# Patient Record
Sex: FEMALE | Race: WHITE | NOT HISPANIC OR LATINO | Employment: UNEMPLOYED | URBAN - METROPOLITAN AREA
[De-identification: names, ages, dates, MRNs, and addresses within clinical notes are randomized per-mention and may not be internally consistent; named-entity substitution may affect disease eponyms.]

---

## 2023-03-30 ENCOUNTER — TRANSFERRED RECORDS (OUTPATIENT)
Dept: HEALTH INFORMATION MANAGEMENT | Facility: CLINIC | Age: 38
End: 2023-03-30

## 2023-03-31 ENCOUNTER — MEDICAL CORRESPONDENCE (OUTPATIENT)
Dept: HEALTH INFORMATION MANAGEMENT | Facility: CLINIC | Age: 38
End: 2023-03-31

## 2023-03-31 ENCOUNTER — TRANSCRIBE ORDERS (OUTPATIENT)
Dept: MATERNAL FETAL MEDICINE | Facility: CLINIC | Age: 38
End: 2023-03-31

## 2023-03-31 DIAGNOSIS — O26.90 PREGNANCY RELATED CONDITION, ANTEPARTUM: Primary | ICD-10-CM

## 2023-04-07 ENCOUNTER — APPOINTMENT (OUTPATIENT)
Dept: ULTRASOUND IMAGING | Facility: CLINIC | Age: 38
End: 2023-04-07
Attending: EMERGENCY MEDICINE
Payer: COMMERCIAL

## 2023-04-07 ENCOUNTER — TRANSFERRED RECORDS (OUTPATIENT)
Dept: HEALTH INFORMATION MANAGEMENT | Facility: CLINIC | Age: 38
End: 2023-04-07

## 2023-04-07 ENCOUNTER — HOSPITAL ENCOUNTER (EMERGENCY)
Facility: CLINIC | Age: 38
Discharge: HOME | End: 2023-04-07
Attending: EMERGENCY MEDICINE | Admitting: EMERGENCY MEDICINE
Payer: COMMERCIAL

## 2023-04-07 VITALS
OXYGEN SATURATION: 100 % | HEART RATE: 79 BPM | DIASTOLIC BLOOD PRESSURE: 79 MMHG | RESPIRATION RATE: 18 BRPM | SYSTOLIC BLOOD PRESSURE: 119 MMHG | TEMPERATURE: 98.1 F

## 2023-04-07 DIAGNOSIS — O46.90 VAGINAL BLEEDING IN PREGNANCY: ICD-10-CM

## 2023-04-07 DIAGNOSIS — R10.2 PELVIC PAIN IN PREGNANCY: ICD-10-CM

## 2023-04-07 DIAGNOSIS — O26.899 PELVIC PAIN IN PREGNANCY: ICD-10-CM

## 2023-04-07 DIAGNOSIS — D25.9 UTERINE LEIOMYOMA, UNSPECIFIED LOCATION: ICD-10-CM

## 2023-04-07 LAB
ABO/RH(D): NORMAL
ANION GAP SERPL CALCULATED.3IONS-SCNC: 12 MMOL/L (ref 7–15)
BASOPHILS # BLD AUTO: 0 10E3/UL (ref 0–0.2)
BASOPHILS NFR BLD AUTO: 0 %
BUN SERPL-MCNC: 8.3 MG/DL (ref 6–20)
CALCIUM SERPL-MCNC: 9.8 MG/DL (ref 8.6–10)
CHLORIDE SERPL-SCNC: 101 MMOL/L (ref 98–107)
CREAT SERPL-MCNC: 0.45 MG/DL (ref 0.51–0.95)
DEPRECATED HCO3 PLAS-SCNC: 24 MMOL/L (ref 22–29)
EOSINOPHIL # BLD AUTO: 0.1 10E3/UL (ref 0–0.7)
EOSINOPHIL NFR BLD AUTO: 1 %
ERYTHROCYTE [DISTWIDTH] IN BLOOD BY AUTOMATED COUNT: 11.5 % (ref 10–15)
GFR SERPL CREATININE-BSD FRML MDRD: >90 ML/MIN/1.73M2
GLUCOSE SERPL-MCNC: 88 MG/DL (ref 70–99)
HCT VFR BLD AUTO: 37.1 % (ref 35–47)
HGB BLD-MCNC: 12.9 G/DL (ref 11.7–15.7)
HOLD SPECIMEN: NORMAL
IMM GRANULOCYTES # BLD: 0 10E3/UL
IMM GRANULOCYTES NFR BLD: 0 %
LYMPHOCYTES # BLD AUTO: 2.3 10E3/UL (ref 0.8–5.3)
LYMPHOCYTES NFR BLD AUTO: 27 %
MCH RBC QN AUTO: 33.7 PG (ref 26.5–33)
MCHC RBC AUTO-ENTMCNC: 34.8 G/DL (ref 31.5–36.5)
MCV RBC AUTO: 97 FL (ref 78–100)
MONOCYTES # BLD AUTO: 0.5 10E3/UL (ref 0–1.3)
MONOCYTES NFR BLD AUTO: 5 %
NEUTROPHILS # BLD AUTO: 5.6 10E3/UL (ref 1.6–8.3)
NEUTROPHILS NFR BLD AUTO: 67 %
NRBC # BLD AUTO: 0 10E3/UL
NRBC BLD AUTO-RTO: 0 /100
PLATELET # BLD AUTO: 216 10E3/UL (ref 150–450)
POTASSIUM SERPL-SCNC: 3.9 MMOL/L (ref 3.4–5.3)
RBC # BLD AUTO: 3.83 10E6/UL (ref 3.8–5.2)
SODIUM SERPL-SCNC: 137 MMOL/L (ref 136–145)
SPECIMEN EXPIRATION DATE: NORMAL
WBC # BLD AUTO: 8.5 10E3/UL (ref 4–11)

## 2023-04-07 PROCEDURE — 80048 BASIC METABOLIC PNL TOTAL CA: CPT | Performed by: EMERGENCY MEDICINE

## 2023-04-07 PROCEDURE — 36415 COLL VENOUS BLD VENIPUNCTURE: CPT | Performed by: EMERGENCY MEDICINE

## 2023-04-07 PROCEDURE — 99285 EMERGENCY DEPT VISIT HI MDM: CPT | Mod: 25

## 2023-04-07 PROCEDURE — 85025 COMPLETE CBC W/AUTO DIFF WBC: CPT | Performed by: EMERGENCY MEDICINE

## 2023-04-07 PROCEDURE — 86901 BLOOD TYPING SEROLOGIC RH(D): CPT | Performed by: EMERGENCY MEDICINE

## 2023-04-07 PROCEDURE — 76805 OB US >/= 14 WKS SNGL FETUS: CPT

## 2023-04-07 NOTE — ED TRIAGE NOTES
Patient has been having vaginal bleeding and abdominal pain for 3 weeks. Patient is 15 weeks pregnant and was advised to come tot he ED by her OB     Triage Assessment     Row Name 04/07/23 1553       Triage Assessment (Adult)    Airway WDL WDL       Respiratory WDL    Respiratory WDL WDL       Skin Circulation/Temperature WDL    Skin Circulation/Temperature WDL WDL       Cardiac WDL    Cardiac WDL WDL       Peripheral/Neurovascular WDL    Peripheral Neurovascular WDL WDL       Cognitive/Neuro/Behavioral WDL    Cognitive/Neuro/Behavioral WDL WDL

## 2023-04-08 NOTE — DISCHARGE INSTRUCTIONS
Discharge Instructions  Vaginal Bleeding in Pregnancy    Bleeding in early pregnancy can be a sign of a miscarriage or an abnormal pregnancy, but often is innocent and the pregnancy will continue normally. We may do blood pregnancy tests and ultrasound to try to determine what is causing the bleeding, but sometimes we are unable to tell. If this is the case, it will often require more time, more blood tests, and another ultrasound.     Generally, every Emergency Department visit should have a follow-up clinic visit with either a primary or a specialty clinic/provider. Please follow-up as instructed by your emergency provider today.    Return to the Emergency Department if:  You have severe abdominal (belly) or pelvic pain.  You faint, or feel lightheaded or dizzy.   Your bleeding gets much worse.  You pass any tissue--solid material that does not look like a blood clot. If you pass tissue, save it (even if you have to pull it out of the toilet) and put it in a plastic bag or jar and bring it in.    You have a fever of 100.5 F or higher.  If no pregnancy could be seen:  It may be that everything is normal and it is just too early to see the pregnancy. It is also possible that you could have an ectopic pregnancy, which is a pregnancy in an abnormal location, such as in the tube ( tubal pregnancy ). We don t see evidence that this is likely today but we cannot exclude it with certainty until a pregnancy can be seen in the uterus (womb) and an ectopic pregnancy can cause severe internal bleeding or death so follow-up is very important.  You should not be alone, in case you suddenly become very sick.   You should not have sex or put anything in your vagina.     If a pregnancy was seen in your uterus:  If a heartbeat could be seen, the chance of miscarriage is lower but because you had bleeding the chance of a miscarriage still exists.  You should not have sex or put anything in your vagina.  Follow-up as directed with  your OB/GYN provider.     Facts about miscarriage: We hope you do not have a miscarriage, but if you do, here are important things to know:  Early miscarriage is very common, and having one miscarriage does not mean you will have problems with another pregnancy.  Nothing you did caused it. Taking medicine, drinking alcohol, having sex, exercising, or falling down will not cause a miscarriage.     If you were given a prescription for medicine here today, be sure to read all of the information (including the package insert) that comes with your prescription.  This will include important information about the medicine, its side effects, and any warnings that you need to know about.  The pharmacist who fills the prescription can provide more information and answer questions you may have about the medicine.  If you have questions or concerns that the pharmacist cannot address, please call or return to the Emergency Department.   Remember that you can always come back to the Emergency Department if you are not able to see your regular provider in the amount of time listed above, if you get any new symptoms, or if there is anything that worries you.     Discharge Instructions  Abdominal Pain    Abdominal pain (belly pain) can be caused by many things. Your evaluation today does not show the exact cause for your pain. Your provider today has decided that it is unlikely your pain is due to a life threatening problem, or a problem requiring surgery or hospital admission. Sometimes those problems cannot be found right away, so it is very important that you follow up as directed.  Sometimes only the changes which occur over time allow the cause of your pain to be found.    Generally, every Emergency Department visit should have a follow-up clinic visit with either a primary or a specialty clinic/provider. Please follow-up as instructed by your emergency provider today. With abdominal pain, we often recommend very close  follow-up, such as the following day.    ADULTS:  Return to the Emergency Department right away if:    You get an oral temperature above 102oF or as directed by your provider.  You have blood in your stools. This may be bright red or appear as black, tarry stools.    You keep vomiting (throwing up) or cannot drink liquids.  You see blood when you vomit.   You cannot have a bowel movement or you cannot pass gas.  Your stomach gets bloated or bigger.  Your skin or the whites of your eyes look yellow.  You faint.  You have bloody, frequent or painful urination (peeing).  You have new symptoms or anything that worries you.    CHILDREN:  Return to the Emergency Department right away if your child has any of the above-listed symptoms or the following:    Pushes your hand away or screams/cries when his/her belly is touched.  You notice your child is very fussy or weak.  Your child is very tired and is too tired to eat or drink.  Your child is dehydrated.  Signs of dehydration can be:  Significant change in the amount of wet diapers/urine.  Your infant or child starts to have dry mouth and lips, or no saliva (spit) or tears.    PREGNANT WOMEN:  Return to the Emergency Department right away if you have any of the above-listed symptoms or the following:    You have bleeding, leaking fluid or passing tissue from the vagina.  You have worse pain or cramping, or pain in your shoulder or back.  You have vomiting that will not stop.  You have a temperature of 100oF or more.  Your baby is not moving as much as usual.  You faint.  You get a bad headache with or without eye problems and abdominal pain.  You have a seizure.  You have unusual discharge from your vagina and abdominal pain.    Abdominal pain is pretty common during pregnancy.  Your pain may or may not be related to your pregnancy. You should follow-up closely with your OB provider so they can evaluate you and your baby.  Until you follow-up with your regular provider, do  "the following:     Avoid sex and do not put anything in your vagina.  Drink clear fluids.  Only take medications approved by your provider.    MORE INFORMATION:    Appendicitis:  A possible cause of abdominal pain in any person who still has their appendix is acute appendicitis. Appendicitis is often hard to diagnose.  Testing does not always rule out early appendicitis or other causes of abdominal pain. Close follow-up with your provider and re-evaluations may be needed to figure out the reason for your abdominal pain.    Follow-up:  It is very important that you make an appointment with your clinic and go to the appointment.  If you do not follow-up with your primary provider, it may result in missing an important development which could result in permanent injury or disability and/or lasting pain.  If there is any problem keeping your appointment, call your provider or return to the Emergency Department.    Medications:  Take your medications as directed by your provider today.  Before using over-the-counter medications, ask your provider and make sure to take the medications as directed.  If you have any questions about medications, ask your provider.    Diet:  Resume your normal diet as much as possible, but do not eat fried, fatty or spicy foods while you have pain.  Do not drink alcohol or have caffeine.  Do not smoke tobacco.    Probiotics: If you have been given an antibiotic, you may want to also take a probiotic pill or eat yogurt with live cultures. Probiotics have \"good bacteria\" to help your intestines stay healthy. Studies have shown that probiotics help prevent diarrhea (loose stools) and other intestine problems (including C. diff infection) when you take antibiotics. You can buy these without a prescription in the pharmacy section of the store.     If you were given a prescription for medicine here today, be sure to read all of the information (including the package insert) that comes with your " prescription.  This will include important information about the medicine, its side effects, and any warnings that you need to know about.  The pharmacist who fills the prescription can provide more information and answer questions you may have about the medicine.  If you have questions or concerns that the pharmacist cannot address, please call or return to the Emergency Department.       Remember that you can always come back to the Emergency Department if you are not able to see your regular provider in the amount of time listed above, if you get any new symptoms, or if there is anything that worries you.

## 2023-04-08 NOTE — ED PROVIDER NOTES
History     Chief Complaint:  Abdominal Pain       HPI Indonesian  via iPhone was used for communication  Mayte Jackson is a 37 year old female  approximately 15 weeks pregnant and otherwise healthy at baseline who presents with concerns for ongoing vaginal bleeding with new pelvic pain.  She notes she has been having 3 weeks of passing clots intermittently for which she is seeing her OB.  She states she has a placental abnormality.  She notes because of the pain she called her OB and they told her to come to the emergency department.  She notes the bleeding has been about the same.  She denies fever or chills.  She denies dizziness or syncope.  She denies any other concerns on today's visit.      Independent Historian:   None - Patient Only    Review of External Notes: I reviewed outpatient records, no significant information of assistance in today's evaluation    ROS:  Review of Systems   Genitourinary: Positive for pelvic pain and vaginal bleeding.   All other systems reviewed and are negative.      Allergies:  No Known Allergies     Medications:    No chronic daily medications    Past Medical History:    No chronic medical problems    Social History:   Here with her significant other  PCP: No primary care provider on file.     Physical Exam   Patient Vitals for the past 24 hrs:   BP Temp Pulse Resp SpO2   23 1505 119/79 98.1  F (36.7  C) 79 18 100 %        Physical Exam  GEN: Adult female, sitting upright  EYES: PERRL, conjunctiva normal  ENT: Moist mucous membranes. Oropharynx clear. No exudate or lesions.  CV: Normal S1S2. Regular rate and rhythm. No murmurs, rubs, or gallops.  RESP: Clear to auscultation bilaterally. Normal effort. No wheezes, rales, or rhonchi.  GI: Abdomen is soft. Mild tenderness in the lower abdomen, most prominent on the left.  No rebound or guarding.  GYN: Normal appearing external genitalia.  Small amount of brownish discharge in the vaginal vault. Closed  cervical os. Normal appearing cervix.   MSK: Ambulatory. Moves all extremities normally. No edema.  Skin: Warm and dry. No rashes, lesions or ecchymoses. No petechiae.  Neuro: Alert and oriented.  Responds appropriately to all questions and commands. No focal abnormalities appreciated.  Psych: Normal affect. Pleasant.    Emergency Department Course       Imaging:  OB  US 2-3 trimester w transvag   Final Result   IMPRESSION:     1.  Single living intrauterine gestation.   2.  Based on this exam, composite age of 15 weeks 3 days with EDC 09/26/2023.   3.  Small uterine fibroid.   4.  Cervix is slightly shortened measuring 2.6 cm with mild funneling.         Report per radiology    Laboratory:  Labs Ordered and Resulted from Time of ED Arrival to Time of ED Departure   BASIC METABOLIC PANEL - Abnormal       Result Value    Sodium 137      Potassium 3.9      Chloride 101      Carbon Dioxide (CO2) 24      Anion Gap 12      Urea Nitrogen 8.3      Creatinine 0.45 (*)     Calcium 9.8      Glucose 88      GFR Estimate >90     CBC WITH PLATELETS AND DIFFERENTIAL - Abnormal    WBC Count 8.5      RBC Count 3.83      Hemoglobin 12.9      Hematocrit 37.1      MCV 97      MCH 33.7 (*)     MCHC 34.8      RDW 11.5      Platelet Count 216      % Neutrophils 67      % Lymphocytes 27      % Monocytes 5      % Eosinophils 1      % Basophils 0      % Immature Granulocytes 0      NRBCs per 100 WBC 0      Absolute Neutrophils 5.6      Absolute Lymphocytes 2.3      Absolute Monocytes 0.5      Absolute Eosinophils 0.1      Absolute Basophils 0.0      Absolute Immature Granulocytes 0.0      Absolute NRBCs 0.0     ABO AND RH    ABO/RH(D) A POS      SPECIMEN EXPIRATION DATE [id number]         Emergency Department Course & Assessments:    Assessments:  I examined the patient.  I performed a pelvic examination.   I reassessed the patient and discussed findings of today's evaluation.  All questions were answered.      Social Determinants of  Health affecting care:   None    Disposition:  The patient was discharged to home.     Impression & Plan      Medical Decision Making:  Mayte Jackson is a 37 year old female healthy at baseline who presents for evaluation of vaginal bleeding and pelvic pain in the setting of second trimester pregnancy.  There is no clear source of the bleeding noted on evaluation here, fibroid considered and possible, but no evidence of placental issue at this time.  Is no significant blood in the vaginal vault on pelvic examination.  With regards to her pelvic pain, I considered a broad differential including ovarian cyst, UTI, pyelonephritis, subchorionic hemorrhage, uterine bleeding, active miscarriage, constipation, diverticulitis, etc.  More rare but serious etiologies considered included ectopic pregnancy, appendicitis, cholecystitis, volvulus, intraabdominal abscess, heterotopic pregnancy, etc.  In this patient, there are no signs of serious etiologies of abdominal pain.  CT scan did not seem indicated clinically with her overall well appearance.  Supportive outpatient management is therefore indicated with her OB in the upcoming days.  Plan is home, close follow-up with OB, threatened miscarriage precautions, and return to ED for worsening pain, heavy vaginal bleeding (more than 1 pad soaked every hour).  Questions were answered.  She felt comfortable this plan,    Diagnosis:    ICD-10-CM    1. Vaginal bleeding in pregnancy  O46.90       2. Pelvic pain in pregnancy  O26.899     R10.2       3. Uterine leiomyoma, unspecified location  D25.9             4/7/2023   Emily Rivas MD Jonkman, Tracy Dianne, MD  04/08/23 1816

## 2023-04-20 ENCOUNTER — PRE VISIT (OUTPATIENT)
Dept: MATERNAL FETAL MEDICINE | Facility: CLINIC | Age: 38
End: 2023-04-20
Payer: COMMERCIAL

## 2023-04-27 ENCOUNTER — HOSPITAL ENCOUNTER (OUTPATIENT)
Dept: ULTRASOUND IMAGING | Facility: CLINIC | Age: 38
Discharge: HOME | End: 2023-04-27
Attending: ADVANCED PRACTICE MIDWIFE
Payer: COMMERCIAL

## 2023-04-27 ENCOUNTER — OFFICE VISIT (OUTPATIENT)
Dept: MATERNAL FETAL MEDICINE | Facility: CLINIC | Age: 38
End: 2023-04-27
Attending: ADVANCED PRACTICE MIDWIFE
Payer: COMMERCIAL

## 2023-04-27 DIAGNOSIS — O26.90 PREGNANCY RELATED CONDITION, ANTEPARTUM: ICD-10-CM

## 2023-04-27 DIAGNOSIS — O09.522 MULTIGRAVIDA OF ADVANCED MATERNAL AGE IN SECOND TRIMESTER: Primary | ICD-10-CM

## 2023-04-27 PROCEDURE — 76811 OB US DETAILED SNGL FETUS: CPT | Mod: 26 | Performed by: OBSTETRICS & GYNECOLOGY

## 2023-04-27 PROCEDURE — 76811 OB US DETAILED SNGL FETUS: CPT

## 2023-04-27 PROCEDURE — 99203 OFFICE O/P NEW LOW 30 MIN: CPT | Mod: 25 | Performed by: OBSTETRICS & GYNECOLOGY

## 2023-04-27 NOTE — NURSING NOTE
Patient presents to M for L2. Denies LOF, vaginal bleeding, cramping/contractions. SBAR given to MFM MD, see their note in Epic.    **Ipad  used for duration of M appointment     Conchita Howard RN

## 2023-04-27 NOTE — PROGRESS NOTES
The patient was seen for an ultrasound in the [facility] at the [facility] today.  For a detailed report of the ultrasound examination, please see the ultrasound report which can be found under the imaging tab.    If you have questions regarding today's evaluation or if we can be of further service, please contact the Maternal[facility]Fetal Medicine Center.    Evelyne Forte MD  , OB/GYN  Maternal-Fetal Medicine  [phone] (Pager)

## 2023-05-21 ENCOUNTER — HEALTH MAINTENANCE LETTER (OUTPATIENT)
Age: 38
End: 2023-05-21

## 2023-07-21 ENCOUNTER — HOSPITAL ENCOUNTER (OUTPATIENT)
Facility: CLINIC | Age: 38
Discharge: HOME | End: 2023-07-21
Attending: OBSTETRICS & GYNECOLOGY | Admitting: OBSTETRICS & GYNECOLOGY
Payer: COMMERCIAL

## 2023-07-21 ENCOUNTER — HOSPITAL ENCOUNTER (OUTPATIENT)
Facility: CLINIC | Age: 38
End: 2023-07-21
Admitting: OBSTETRICS & GYNECOLOGY
Payer: COMMERCIAL

## 2023-07-21 ENCOUNTER — TRANSFERRED RECORDS (OUTPATIENT)
Dept: OBGYN | Facility: CLINIC | Age: 38
End: 2023-07-21
Payer: COMMERCIAL

## 2023-07-21 VITALS — TEMPERATURE: 98.6 F | OXYGEN SATURATION: 99 % | DIASTOLIC BLOOD PRESSURE: 67 MMHG | SYSTOLIC BLOOD PRESSURE: 104 MMHG

## 2023-07-21 PROBLEM — Z36.89 ENCOUNTER FOR TRIAGE IN PREGNANT PATIENT: Status: ACTIVE | Noted: 2023-07-21

## 2023-07-21 LAB
ALBUMIN UR-MCNC: NEGATIVE MG/DL
APPEARANCE UR: CLEAR
BACTERIAL VAGINOSIS VAG-IMP: NEGATIVE
BILIRUB UR QL STRIP: NEGATIVE
CANDIDA DNA VAG QL NAA+PROBE: DETECTED
CANDIDA GLABRATA / CANDIDA KRUSEI DNA: NOT DETECTED
CLUE CELLS: ABNORMAL
COLOR UR AUTO: ABNORMAL
FFN SPECIMEN INTEGRITY: NORMAL
FIBRONECTIN FETAL VAG QL: NEGATIVE
GLUCOSE UR STRIP-MCNC: NEGATIVE MG/DL
HGB UR QL STRIP: NEGATIVE
KETONES UR STRIP-MCNC: 100 MG/DL
LEUKOCYTE ESTERASE UR QL STRIP: NEGATIVE
MUCOUS THREADS #/AREA URNS LPF: PRESENT /LPF
NITRATE UR QL: NEGATIVE
PH UR STRIP: 7 [PH] (ref 5–7)
RBC URINE: <1 /HPF
SP GR UR STRIP: 1.01 (ref 1–1.03)
SQUAMOUS EPITHELIAL: 1 /HPF
T VAGINALIS DNA VAG QL NAA+PROBE: NOT DETECTED
TRICHOMONAS, WET PREP: ABNORMAL
UROBILINOGEN UR STRIP-MCNC: NORMAL MG/DL
WBC URINE: 1 /HPF
WBC'S/HIGH POWER FIELD, WET PREP: ABNORMAL
YEAST, WET PREP: ABNORMAL

## 2023-07-21 PROCEDURE — 99204 OFFICE O/P NEW MOD 45 MIN: CPT | Mod: 25 | Performed by: OBSTETRICS & GYNECOLOGY

## 2023-07-21 PROCEDURE — 82731 ASSAY OF FETAL FIBRONECTIN: CPT | Performed by: OBSTETRICS & GYNECOLOGY

## 2023-07-21 PROCEDURE — 87481 CANDIDA DNA AMP PROBE: CPT | Performed by: OBSTETRICS & GYNECOLOGY

## 2023-07-21 PROCEDURE — 81001 URINALYSIS AUTO W/SCOPE: CPT | Performed by: OBSTETRICS & GYNECOLOGY

## 2023-07-21 PROCEDURE — 87210 SMEAR WET MOUNT SALINE/INK: CPT | Performed by: OBSTETRICS & GYNECOLOGY

## 2023-07-21 PROCEDURE — 87801 DETECT AGNT MULT DNA AMPLI: CPT | Performed by: OBSTETRICS & GYNECOLOGY

## 2023-07-21 PROCEDURE — 59025 FETAL NON-STRESS TEST: CPT | Mod: 26 | Performed by: OBSTETRICS & GYNECOLOGY

## 2023-07-21 PROCEDURE — G0463 HOSPITAL OUTPT CLINIC VISIT: HCPCS | Mod: 25

## 2023-07-21 PROCEDURE — 96360 HYDRATION IV INFUSION INIT: CPT

## 2023-07-21 PROCEDURE — 250N000013 HC RX MED GY IP 250 OP 250 PS 637: Performed by: OBSTETRICS & GYNECOLOGY

## 2023-07-21 PROCEDURE — 258N000003 HC RX IP 258 OP 636: Performed by: OBSTETRICS & GYNECOLOGY

## 2023-07-21 RX ORDER — ACETAMINOPHEN 325 MG/1
1000 TABLET ORAL EVERY 6 HOURS PRN
Status: ON HOLD | COMMUNITY
End: 2023-09-15

## 2023-07-21 RX ORDER — SIMETHICONE 80 MG
160 TABLET,CHEWABLE ORAL ONCE
Status: COMPLETED | OUTPATIENT
Start: 2023-07-21 | End: 2023-07-21

## 2023-07-21 RX ORDER — LIDOCAINE 40 MG/G
CREAM TOPICAL
Status: DISCONTINUED | OUTPATIENT
Start: 2023-07-21 | End: 2023-07-21 | Stop reason: HOSPADM

## 2023-07-21 RX ORDER — MAGNESIUM HYDROXIDE/ALUMINUM HYDROXICE/SIMETHICONE 120; 1200; 1200 MG/30ML; MG/30ML; MG/30ML
30 SUSPENSION ORAL
Status: COMPLETED | OUTPATIENT
Start: 2023-07-21 | End: 2023-07-21

## 2023-07-21 RX ORDER — PRENATAL VIT/IRON FUM/FOLIC AC 27MG-0.8MG
1 TABLET ORAL DAILY
COMMUNITY

## 2023-07-21 RX ADMIN — SODIUM CHLORIDE, POTASSIUM CHLORIDE, SODIUM LACTATE AND CALCIUM CHLORIDE 1000 ML: 600; 310; 30; 20 INJECTION, SOLUTION INTRAVENOUS at 11:40

## 2023-07-21 RX ADMIN — SIMETHICONE 160 MG: 80 TABLET, CHEWABLE ORAL at 13:36

## 2023-07-21 RX ADMIN — ALUMINUM HYDROXIDE, MAGNESIUM HYDROXIDE, AND DIMETHICONE 30 ML: 200; 20; 200 SUSPENSION ORAL at 13:36

## 2023-07-21 ASSESSMENT — ACTIVITIES OF DAILY LIVING (ADL)
ADLS_ACUITY_SCORE: 31

## 2023-07-21 NOTE — PROVIDER NOTIFICATION
"   23 1145   Provider Notification   Provider Name/Title Dr. Rain   Method of Notification In Department   Request Evaluate - Remote   Notification Reason Patient Arrived;Uterine Activity;Pain     Dr Anu Rain informed of patient arrival and assessment including the following:    Reason for maternal/fetal assessment abdominal pain, nausea. Communication facilitated by patient's sister, interpreting at patient's request. Pt reports sharp, shooting pain in her LLQ that began yesterday and has worsened overnight. The pain is not constant, but comes in waves \"only on the left side\" and \"it gets much worse when I stand or walk.\"  She took 1000mg tylenol at 0930 this AM with some relief, but still rates her pain 7/10. Hot pack applied for comfort. Per PAUL Newberry, patient stated her LLQ was tender to the touch, writer was able to palpate maternal abdomen without strong reaction. Patient denies abnormal vaginal discharge, sx/sx of UTI, BV, VB, LOF. She reports that she is nauseated but has not vomited; \"the pain is worse than the nausea.\" She has not eaten today, but has been drinking water. She also reports a normal BM this morning, and is able to pass gas.    Of note, prenatal record states her first delivery was term, but patient reports NVSD at 35w; she does not remember the indication for a  delivery. Fetal status normal baseline, moderate variability, accelerations present and no decelerations. Ctx occurring Q1-3 and palpate mild; patient states she only feels LLQ pain, not ctx pain. 1L LR bolus currently infusing.     Plan per provider/orders received to collect FFN, wet prep, multiplex vaginal swab, UA + UC. Provider does not want PO nifedipine at this time. RN will maintain continuous EFM + toco and update MD with results and/or concerns.    "

## 2023-07-21 NOTE — PROVIDER NOTIFICATION
07/21/23 0348   Provider Notification   Provider Name/Title Dr. Rain   Method of Notification At Bedside   Request Evaluate in Person   Notification Reason Status Update     Dr. Rain at bedside discussing with patient that pain is most likely round ligament pain.  Spouse at bedside translating for patient. Orders to discharge patient home, pt/spouse agrees with plan.        For the next 24 hours patient needs to be with a responsible adult.    For 24 hours DO NOT drive, operate machinery, appliances, drink alcohol, make important decisions or sign legal documents.    Start with a light or bland diet if you are feeling sick to your stomach otherwise advance to regular diet as tolerated.    Follow recommendations on procedure report if provided by your doctor.    Call Dr Hirsch for problems     Problems may include but not limited to: large amounts of bleeding, trouble breathing, repeated vomiting, severe unrelieved pain, fever or chills.

## 2023-07-21 NOTE — PROGRESS NOTES
Obstetrics Triage Note    HPI:  Mayte Jackson is a 37 year old  female at 30w1d here with complaints of LLQ pain. Patient of [facility], not sure where she plans to deliver. Was not aware that her primary OB team does not deliver/round here.      Patient states that pain started today, intermittent, only in LLQ and does not radiate.  + FM.  She states that she has otherwise been feeling well. She denies fever, N/V, chest pain, SOB, abdominal pain, vaginal bleeding, vaginal discharge, dysuria, constipation.    ROS:  Negative except as mentioned in HPI.    PMH:  History reviewed. No pertinent past medical history.    PSHx:  History reviewed. No pertinent surgical history.    Medications:  No current facility-administered medications on file prior to encounter.  acetaminophen (TYLENOL) 325 MG tablet, Take 1,000 mg by mouth every 6 hours as needed for mild pain  Prenatal Vit-Fe Fumarate-FA (PRENATAL MULTIVITAMIN W/IRON) 27-0.8 MG tablet, Take 1 tablet by mouth daily         Allergies:   No Known Allergies    Physical Exam:   Vitals:    23 1042 23 1050 23 1222   BP: 104/67     Temp:  98.6  F (37  C)    TempSrc:  Oral    SpO2:   99%      Gen: resting comfortably, in NAD  Pulm: no increased work of breathing  Abd: soft, gravid, non-tender other than with deep palpation of LLQ, non-distended. No rebound, no guarding.  Cx: Visually closed per RN spec exam. I did not reexamine given her presentation and negative FFN and the fact that she was clinically improved and not in pain when I saw her.    NST:  FHT: BL normal, moderate oneyda, + accels, no decels Reactive NST  [place]: initially every couple of minutes, she was not feeling any of them. With fluid bolus, these resolved to every 10 mins or more.    Labs/Imaging:  Results for orders placed or performed during the hospital encounter of 23 (from the past 24 hour(s))   Fetal fibronectin   Result Value Ref Range    Fetal Fibronectin Negative  Negative    FFN Specimen Integrity Satisfactory Specimen    Wet prep    Specimen: Vagina; Swab   Result Value Ref Range    Trichomonas Absent Absent    Yeast Absent Absent    Clue Cells Absent Absent    WBCs/high power field 3+ (A) None   UA with Microscopic reflex to Culture    Specimen: Urine, Clean Catch   Result Value Ref Range    Color Urine Light Yellow Colorless, Straw, Light Yellow, Yellow    Appearance Urine Clear Clear    Glucose Urine Negative Negative mg/dL    Bilirubin Urine Negative Negative    Ketones Urine 100 (A) Negative mg/dL    Specific Gravity Urine 1.011 1.003 - 1.035    Blood Urine Negative Negative    pH Urine 7.0 5.0 - 7.0    Protein Albumin Urine Negative Negative mg/dL    Urobilinogen Urine Normal Normal, 2.0 mg/dL    Nitrite Urine Negative Negative    Leukocyte Esterase Urine Negative Negative    Mucus Urine Present (A) None Seen /LPF    RBC Urine <1 <=2 /HPF    WBC Urine 1 <=5 /HPF    Squamous Epithelials Urine 1 <=1 /HPF    Narrative    Urine Culture not indicated       Assessment/Plan: Mayte Jackson is a 37 year old female  at 30w1d here for LLQ pain in pregnancy  -Pain increased a bit initially after eating something, then improved dramatically after simethicone and Mylanta po. Discussed Gas pain, etc as well as round ligament pain.  - Dispo: to home with follow up in the next week with her primary OB team. Discussed tylenol for pain as needed. Discussed labor warning signs and indication to return to care.    Anu Rain MD  [facility] Obstetrics and Gynecology  2023 4:11 PM

## 2023-07-21 NOTE — CARE PLAN
Data: [facility] presented to [facility]: 2023 10:23 AM.  Reason for maternal/fetal assessment is abdominal pain and nausea. Patient reports abdominal pain starting last night at 1900, pt then was nauseous this AM, pt was seen in April for vaginal bleeding in ED, pt has a uterine fibroid, currently pt is experiencing LLQ pain and tender to the touch.  Patient is a .  Prenatal record reviewed. Pregnancy  has been complicated by has been uncomplicated.  Gestational Age 30w1d. VSS. Fetal movement present. Patient denies uterine contractions, leaking of vaginal fluid/rupture of membranes, vaginal bleeding, pelvic pressure, vomiting, headache, visual disturbances, epigastric or RUQ pain, significant edema. Support person is present.   Action: Verbal consent for EFM. Triage assessment completed. Bill of rights reviewed.  Response: Patient verbalized agreement with plan. Will contact Dr Anu Rain with update and further orders.    Writer RN used ipad , support person bedside and pointed out  was not understanding or interpreting correctly.

## 2023-07-21 NOTE — PLAN OF CARE
Data: Patient assessed in the [facility] for abdominal pain.  Cervical exam not examined.  Membranes intact.  Contractions/uterine assessment; contractions noted on toco, palpate mild, pt not feeling.  Action:  Presumed adequate fetal oxygenation documented (see flow record). Discharge instructions reviewed.  Patient instructed to report change in fetal movement, vaginal leaking of fluid or bleeding, abdominal pain, or any concerns related to the pregnancy to her nurse/physician.    Response: Orders to discharge home per Anu Rain.  Patient verbalized understanding of education and verbalized agreement with plan. Discharged to home at 1620.

## 2023-07-21 NOTE — PROVIDER NOTIFICATION
07/21/23 1431   Provider Notification   Provider Name/Title Dr. Rain   Method of Notification Electronic Page;Phone   Request Evaluate - Remote   Notification Reason Status Update     We were waiting for the last swab to result, called lab and found that the vaginal PCR swab takes 5 hours and won't be a result we will wait for. Pt's pain is better, went from 9 to 4-5. MD will come round on pt after seeing a few pt's.

## 2023-07-21 NOTE — PROVIDER NOTIFICATION
07/21/23 1320   Provider Notification   Provider Name/Title Dr. Rain   Method of Notification Phone   Request Evaluate - Remote   Notification Reason Lab/Diagnostic Study;Pain     RN updated MD that all labs but multiplex have resulted WNL, mulitplex swab pending. Cervix well-visualized during SEE and appeared long, think and closed with appropriate creamy white lochia. No sx/sx of vaginal infection, ROM, VB. FHR tracing WNL. CTX still occurring Q2-3, slightly more spaced than before LR bolus. At 1250, patient had a snack of apple juice and crackers, and now reports increase in pain, rated 9/10.     Order received to give PO Maalox and 160mg PO simethicone. RN will update MD with findings.

## 2023-07-21 NOTE — DISCHARGE INSTRUCTIONS
Undelivered Patients Discharge Instructions: [place]    ???????:  Pain  ????????????: Dr. Rain  ?? ???????? (???????????? ??? ?????????): fetal/uterine monitoring, urinalysis, wet prep, fetal fibronectin      ?????:   Drink 8 to 12 glasses of liquids (milk, juice, water) every day.  You may eat meals and snacks.     ?????????? ????????:  Count fetal kicks everyday (see handout)  Call your doctor or nurse midwife if your baby is moving less than usual.     ????????? ?????, ???? ? ??? ????? ??????????? ?????????:  ???? ???? ??? ?????????? ????????? ??? ??? ???.  ???????? ????, ??????? ?? ???????? ????? ?????? ????????? (?????????????).  ????????? ?????? (???????? ??????:  ?????  ???  ?????  ????? ???????).  ??????? ??? ?????.  ????? ? ???? 5 ?????? (2,5 ??) ??? ????? ?? ??????.  ??????, ??????? ?? ????????.  ???????? ???????? ???????? ??????: ???? ?? ????? ??????????????, ??????? ?????? ? ?????? ????? ????? ? ????????????? ????? ???????.  ? ??? ?????? ??????? ?????? (?????? ????) ??? ?? ????????, ??? ? ??? ?????? ?????? ?????.  ???? ??????? ????? ?? ?????? ?????.  ???? ? ?????? ????? ?????? ??? ???? ? ??????.  ?????? ???????: ??????? (??????????), ??????????? ????, ??? ?????? 5 ????? ? ??????? ?????? ???? ??? ?????.  ?????? (??? ?? ??????) ???????: ??????? (??????????), ??????????? ????, ??? ?????? 10 ?????, ? ??? ???? ?????????????.  ?????????? ?????????? ? ????????? ????????? ??????????? ????????? (????????? ???????? ?? ???? ? ??????????)    ????????? ?????, ???? ? ??? ?? ????? ?? 37 ?????? ??????????? ?????????:  ??????? ? ?????????? ? 10 ??? ????? ?????   ????????? ????????? ??????????? ?????????   ???????? ? ??????? ????  ????? ???? ? ????????  ??????????????? ????, ??? ??? ????????  ??????????????? ???? ????? ??????, ??????? ????? ?????????????? ???????  ??????????? ??????????:    ??????: Make an appointment to be seen on : call your clinic and let them know that you were here at [facility] and  should be scheduled for a clinic appointment.

## 2023-08-03 ENCOUNTER — HOSPITAL ENCOUNTER (OUTPATIENT)
Facility: CLINIC | Age: 38
Discharge: HOME | End: 2023-08-03
Attending: ADVANCED PRACTICE MIDWIFE | Admitting: ADVANCED PRACTICE MIDWIFE
Payer: COMMERCIAL

## 2023-08-03 VITALS
TEMPERATURE: 98.1 F | WEIGHT: 131 LBS | HEART RATE: 83 BPM | DIASTOLIC BLOOD PRESSURE: 68 MMHG | BODY MASS INDEX: 24.73 KG/M2 | HEIGHT: 61 IN | SYSTOLIC BLOOD PRESSURE: 109 MMHG | RESPIRATION RATE: 16 BRPM

## 2023-08-03 LAB
ALBUMIN UR-MCNC: 20 MG/DL
APPEARANCE UR: CLEAR
BILIRUB UR QL STRIP: NEGATIVE
CLUE CELLS: ABNORMAL
COLOR UR AUTO: YELLOW
ERYTHROCYTE [DISTWIDTH] IN BLOOD BY AUTOMATED COUNT: 12.6 % (ref 10–15)
FFN SPECIMEN INTEGRITY: NORMAL
FIBRONECTIN FETAL VAG QL: NEGATIVE
GLUCOSE UR STRIP-MCNC: NEGATIVE MG/DL
HCT VFR BLD AUTO: 30.5 % (ref 35–47)
HGB BLD-MCNC: 10.4 G/DL (ref 11.7–15.7)
HGB UR QL STRIP: NEGATIVE
KETONES UR STRIP-MCNC: 40 MG/DL
LEUKOCYTE ESTERASE UR QL STRIP: NEGATIVE
MCH RBC QN AUTO: 32 PG (ref 26.5–33)
MCHC RBC AUTO-ENTMCNC: 34.1 G/DL (ref 31.5–36.5)
MCV RBC AUTO: 94 FL (ref 78–100)
MUCOUS THREADS #/AREA URNS LPF: PRESENT /LPF
NITRATE UR QL: NEGATIVE
PH UR STRIP: 6 [PH] (ref 5–7)
PLATELET # BLD AUTO: 165 10E3/UL (ref 150–450)
RBC # BLD AUTO: 3.25 10E6/UL (ref 3.8–5.2)
RBC URINE: <1 /HPF
SP GR UR STRIP: 1.02 (ref 1–1.03)
SQUAMOUS EPITHELIAL: 1 /HPF
TRICHOMONAS, WET PREP: ABNORMAL
UROBILINOGEN UR STRIP-MCNC: <2 MG/DL
WBC # BLD AUTO: 10.8 10E3/UL (ref 4–11)
WBC URINE: 1 /HPF
WBC'S/HIGH POWER FIELD, WET PREP: ABNORMAL
YEAST, WET PREP: ABNORMAL

## 2023-08-03 PROCEDURE — 36415 COLL VENOUS BLD VENIPUNCTURE: CPT | Performed by: ADVANCED PRACTICE MIDWIFE

## 2023-08-03 PROCEDURE — 85027 COMPLETE CBC AUTOMATED: CPT | Performed by: ADVANCED PRACTICE MIDWIFE

## 2023-08-03 PROCEDURE — 81003 URINALYSIS AUTO W/O SCOPE: CPT | Performed by: ADVANCED PRACTICE MIDWIFE

## 2023-08-03 PROCEDURE — 82731 ASSAY OF FETAL FIBRONECTIN: CPT | Performed by: ADVANCED PRACTICE MIDWIFE

## 2023-08-03 PROCEDURE — 258N000003 HC RX IP 258 OP 636: Performed by: ADVANCED PRACTICE MIDWIFE

## 2023-08-03 PROCEDURE — 87210 SMEAR WET MOUNT SALINE/INK: CPT | Performed by: ADVANCED PRACTICE MIDWIFE

## 2023-08-03 RX ORDER — LIDOCAINE 40 MG/G
CREAM TOPICAL
Status: DISCONTINUED | OUTPATIENT
Start: 2023-08-03 | End: 2023-08-03 | Stop reason: HOSPADM

## 2023-08-03 RX ADMIN — SODIUM CHLORIDE, POTASSIUM CHLORIDE, SODIUM LACTATE AND CALCIUM CHLORIDE 1000 ML: 600; 310; 30; 20 INJECTION, SOLUTION INTRAVENOUS at 17:13

## 2023-08-03 ASSESSMENT — ACTIVITIES OF DAILY LIVING (ADL)
ADLS_ACUITY_SCORE: 31
ADLS_ACUITY_SCORE: 31

## 2023-08-03 NOTE — PROGRESS NOTES
CNM ordered FFN, wet prep and UA. IV bolus of lactated ringers also ordered and give.   Contractions have now  spaced out. CNM has consulted with OB/GYN. CNM in to see patient at this time.

## 2023-08-03 NOTE — PROGRESS NOTES
Data: Patient presented to [facility]: 8/3/2023  3:23 PM.  Reason for maternal/fetal assessment is  left inguinal  pain . Patient reports left inguinal pain increasing over last 2 weeks, causing difficulty with ADL's. Patient denies uterine contractions, leaking of vaginal fluid/rupture of membranes, vaginal bleeding, pelvic pressure, nausea, vomiting, headache, visual disturbances, epigastric or RUQ pain, significant edema. Patient reports fetal movement is normal. Patient is a 32w0d .  Prenatal record reviewed. Pregnancy has been uncomplicated.    Vital signs wnl. Support person is present.     Action: Verbal consent for EFM. Triage assessment completed. EFM applied. Category 1 tracing  with accels present, no decels noted. Contractions noted every 1.5-3 minutes, palpate mild. Patient is generally uncomfortable.    Response: Patient verbalized agreement with plan. Will contact Elisabeth Dailey CNM with update and further orders.

## 2023-08-04 ENCOUNTER — HOSPITAL ENCOUNTER (EMERGENCY)
Facility: CLINIC | Age: 38
Discharge: HOME | End: 2023-08-04
Attending: EMERGENCY MEDICINE | Admitting: EMERGENCY MEDICINE
Payer: COMMERCIAL

## 2023-08-04 ENCOUNTER — APPOINTMENT (OUTPATIENT)
Dept: MRI IMAGING | Facility: CLINIC | Age: 38
End: 2023-08-04
Attending: EMERGENCY MEDICINE
Payer: COMMERCIAL

## 2023-08-04 VITALS
OXYGEN SATURATION: 99 % | HEART RATE: 94 BPM | WEIGHT: 131 LBS | RESPIRATION RATE: 16 BRPM | DIASTOLIC BLOOD PRESSURE: 72 MMHG | SYSTOLIC BLOOD PRESSURE: 110 MMHG | BODY MASS INDEX: 21.83 KG/M2 | TEMPERATURE: 97.5 F | HEIGHT: 65 IN

## 2023-08-04 DIAGNOSIS — O35.EXX0: ICD-10-CM

## 2023-08-04 DIAGNOSIS — R10.9 LEFT SIDED ABDOMINAL PAIN: ICD-10-CM

## 2023-08-04 DIAGNOSIS — R10.9 ABDOMINAL PAIN DURING PREGNANCY IN THIRD TRIMESTER: ICD-10-CM

## 2023-08-04 DIAGNOSIS — O26.893 ABDOMINAL PAIN DURING PREGNANCY IN THIRD TRIMESTER: ICD-10-CM

## 2023-08-04 PROBLEM — N92.6 MISSED PERIOD: Status: ACTIVE | Noted: 2023-08-04

## 2023-08-04 LAB
ALBUMIN SERPL-MCNC: 2.9 G/DL (ref 3.5–5)
ALBUMIN UR-MCNC: NEGATIVE MG/DL
ALP SERPL-CCNC: 73 U/L (ref 45–120)
ALT SERPL W P-5'-P-CCNC: 18 U/L (ref 0–45)
ANION GAP SERPL CALCULATED.3IONS-SCNC: 9 MMOL/L (ref 5–18)
APPEARANCE UR: CLEAR
AST SERPL W P-5'-P-CCNC: 15 U/L (ref 0–40)
BACTERIA #/AREA URNS HPF: ABNORMAL /HPF
BASOPHILS # BLD AUTO: 0 10E3/UL (ref 0–0.2)
BASOPHILS NFR BLD AUTO: 0 %
BILIRUB SERPL-MCNC: 0.3 MG/DL (ref 0–1)
BILIRUB UR QL STRIP: NEGATIVE
BUN SERPL-MCNC: 4 MG/DL (ref 8–22)
CALCIUM SERPL-MCNC: 8.5 MG/DL (ref 8.5–10.5)
CHLORIDE BLD-SCNC: 106 MMOL/L (ref 98–107)
CO2 SERPL-SCNC: 22 MMOL/L (ref 22–31)
COLOR UR AUTO: COLORLESS
CREAT SERPL-MCNC: 0.5 MG/DL (ref 0.6–1.1)
EOSINOPHIL # BLD AUTO: 0 10E3/UL (ref 0–0.7)
EOSINOPHIL NFR BLD AUTO: 0 %
ERYTHROCYTE [DISTWIDTH] IN BLOOD BY AUTOMATED COUNT: 12.6 % (ref 10–15)
GFR SERPL CREATININE-BSD FRML MDRD: >90 ML/MIN/1.73M2
GLUCOSE BLD-MCNC: 89 MG/DL (ref 70–125)
GLUCOSE UR STRIP-MCNC: NEGATIVE MG/DL
HCT VFR BLD AUTO: 31.2 % (ref 35–47)
HGB BLD-MCNC: 10.8 G/DL (ref 11.7–15.7)
HGB UR QL STRIP: NEGATIVE
IMM GRANULOCYTES # BLD: 0.1 10E3/UL
IMM GRANULOCYTES NFR BLD: 1 %
KETONES UR STRIP-MCNC: NEGATIVE MG/DL
LEUKOCYTE ESTERASE UR QL STRIP: NEGATIVE
LIPASE SERPL-CCNC: 9 U/L (ref 0–52)
LYMPHOCYTES # BLD AUTO: 1.8 10E3/UL (ref 0.8–5.3)
LYMPHOCYTES NFR BLD AUTO: 21 %
MCH RBC QN AUTO: 32.2 PG (ref 26.5–33)
MCHC RBC AUTO-ENTMCNC: 34.6 G/DL (ref 31.5–36.5)
MCV RBC AUTO: 93 FL (ref 78–100)
MONOCYTES # BLD AUTO: 0.6 10E3/UL (ref 0–1.3)
MONOCYTES NFR BLD AUTO: 6 %
MUCOUS THREADS #/AREA URNS LPF: PRESENT /LPF
NEUTROPHILS # BLD AUTO: 6.2 10E3/UL (ref 1.6–8.3)
NEUTROPHILS NFR BLD AUTO: 72 %
NITRATE UR QL: NEGATIVE
NRBC # BLD AUTO: 0 10E3/UL
NRBC BLD AUTO-RTO: 0 /100
PH UR STRIP: 6.5 [PH] (ref 5–7)
PLATELET # BLD AUTO: 172 10E3/UL (ref 150–450)
POTASSIUM BLD-SCNC: 3.7 MMOL/L (ref 3.5–5)
PROT SERPL-MCNC: 6.4 G/DL (ref 6–8)
RBC # BLD AUTO: 3.35 10E6/UL (ref 3.8–5.2)
RBC URINE: <1 /HPF
SODIUM SERPL-SCNC: 137 MMOL/L (ref 136–145)
SP GR UR STRIP: 1.01 (ref 1–1.03)
SQUAMOUS EPITHELIAL: 1 /HPF
UROBILINOGEN UR STRIP-MCNC: <2 MG/DL
WBC # BLD AUTO: 8.7 10E3/UL (ref 4–11)
WBC URINE: 1 /HPF

## 2023-08-04 PROCEDURE — 36415 COLL VENOUS BLD VENIPUNCTURE: CPT | Performed by: EMERGENCY MEDICINE

## 2023-08-04 PROCEDURE — 83690 ASSAY OF LIPASE: CPT | Performed by: EMERGENCY MEDICINE

## 2023-08-04 PROCEDURE — 85025 COMPLETE CBC W/AUTO DIFF WBC: CPT | Performed by: EMERGENCY MEDICINE

## 2023-08-04 PROCEDURE — 74181 MRI ABDOMEN W/O CONTRAST: CPT

## 2023-08-04 PROCEDURE — 81001 URINALYSIS AUTO W/SCOPE: CPT | Performed by: EMERGENCY MEDICINE

## 2023-08-04 PROCEDURE — 99284 EMERGENCY DEPT VISIT MOD MDM: CPT | Mod: 25

## 2023-08-04 PROCEDURE — 250N000013 HC RX MED GY IP 250 OP 250 PS 637: Performed by: EMERGENCY MEDICINE

## 2023-08-04 PROCEDURE — 80053 COMPREHEN METABOLIC PANEL: CPT | Performed by: EMERGENCY MEDICINE

## 2023-08-04 RX ORDER — ACETAMINOPHEN 325 MG/1
975 TABLET ORAL ONCE
Status: COMPLETED | OUTPATIENT
Start: 2023-08-04 | End: 2023-08-04

## 2023-08-04 RX ADMIN — ACETAMINOPHEN 975 MG: 325 TABLET ORAL at 14:00

## 2023-08-04 ASSESSMENT — ACTIVITIES OF DAILY LIVING (ADL)
ADLS_ACUITY_SCORE: 35
ADLS_ACUITY_SCORE: 35

## 2023-08-04 NOTE — ED PROVIDER NOTES
EMERGENCY DEPARTMENT ENCOUNTER      NAME: Mayte Jackson  AGE: 37 year old female  YOB: 1985  MRN: [id number]  EVALUATION DATE & TIME: 2023  1:14 PM    PCP: No Ref-Primary, Physician    ED PROVIDER: Nuria Corrales MD    Chief Complaint   Patient presents with    Abdominal Pain         FINAL IMPRESSION:  1. Left sided abdominal pain    2. Abdominal pain during pregnancy in third trimester          ED COURSE & MEDICAL DECISION MAKING:    Pertinent Labs & Imaging studies reviewed. (See chart for details)  37 year old female with history of G2, P1 at 32 weeks gestational age who presents to the Emergency Department for evaluation of approximately 2 weeks of left-sided abdominal pain.  Pain is left mid and left lower quadrant of the abdomen.  She did have some contractions yesterday and  both prompting OB ED visits for fluids, which ultimately stopped these.  She has not had any ongoing contractions and on examination does not have any uterine pain.  Is feeling baby move, no leakage of fluid bleeding spotting.  Had fetal fibronectin yesterday.  This is not consistent with  labor.    She did have a urinalysis yesterday that was unremarkable and denies any urinary symptoms of dysuria urgency frequency.  Though patient is 32 weeks pregnant, she is quite petite.  I do have concerns for ureteral compression, left-sided hydronephrosis, ureterolithiasis.  She denies any change in bowel habits to suspect a enteritis or colitis.  Certainly left-sided ovarian cyst or pathology is on the differential.    Patient was sent in the ED for an ultrasound but I feel all of the above would be more properly evaluated with a MRI of the abdomen and pelvis.  Discussed with her OB/GYN clinic, who is on board with plan.  CBC, CMP, lipase notable only for hypoalbuminemia and anemia unchanged from previous.  Given Tylenol for pain.  Urinalysis and MRI of the abdomen and pelvis are pending at time of dictation.   Patient signed out to oncoming physician awaiting results of same.      ED Course as of 23 1457   Fri Aug 04, 2023   1332 I met with the patient to gather history and to perform my initial exam. I discussed the plan for care while in the Emergency Department.    1354 Spoke raymond Seo, with [facility]   1402 Hemoglobin(!): 10.8   1421 Albumin(!): 2.9         Medical Decision Making    History:  Supplemental history from: Documented in chart, if applicable  External Record(s) reviewed: Outpatient Record: OB ED visits yesterday and     Work Up:  Chart documentation includes differential considered and any EKGs or imaging independently interpreted by provider, where specified.  In additional to work up documented, I considered the following work up: N/A    External consultation:  Discussion of management with another provider: CYNDIGYAMBER midwife    Complicating factors:  Care impacted by chronic illness: N/A  Care affected by social determinants of health: Access to Medical Care referred to ED    Disposition considerations: Admission considered. Patient was signed out to the oncoming physician, disposition pending.        At the conclusion of the encounter I discussed the results of all of the tests and the disposition. The questions were answered. The patient or family acknowledged understanding and was agreeable with the care plan.      MEDICATIONS GIVEN IN THE EMERGENCY:  Medications   acetaminophen (TYLENOL) tablet 975 mg (975 mg Oral $Given 23 1400)       NEW PRESCRIPTIONS STARTED AT TODAY'S ER VISIT  New Prescriptions    No medications on file          =================================================================    HPI    Patient information was obtained from: Patient    Use of Intrepreter: Yes (phone) - Language Maria Del Rosario Jackson is a 37 year old female with no pertinent medical history who presents for abdominal pain.    Patient is  at 32 weeks pregnant.     Patient  "endorses intermittent left lower abdominal pain for the past several weeks. Patient reports being seen a few weeks ago in the ED at [facility] for abdominal pain and had some testing done. She states they did not do an ultrasound like she wanted and was told to follow up with OB. She did and they instructed her to follow up with labor and delivery which she did yesterday. Labor and delivery also did not do an ultrasound and told her to follow up with primary care. Later that day she became nauseous. She called her PCP today and was told to come to the ER for an ultrasound.     Patient reports she can feel her baby move. She notes this is her 2nd pregnancy, and delivered her 1st pregnancy at 35 weeks. Patient denies vaginal discharge, vaginal bleeding, dysuria, hematuria, back pain, bowel problems, abdominal distention, vomiting, fever, chills, or any other complaints at this time.       PAST MEDICAL HISTORY:  History reviewed. No pertinent past medical history.    PAST SURGICAL HISTORY:  History reviewed. No pertinent surgical history.    CURRENT MEDICATIONS:    Prior to Admission Medications   Prescriptions Last Dose Informant Patient Reported? Taking?   Prenatal Vit-Fe Fumarate-FA (PRENATAL MULTIVITAMIN W/IRON) 27-0.8 MG tablet   Yes No   Sig: Take 1 tablet by mouth daily   acetaminophen (TYLENOL) 325 MG tablet   Yes No   Sig: Take 1,000 mg by mouth every 6 hours as needed for mild pain      Facility-Administered Medications: None       ALLERGIES:  No Known Allergies    FAMILY HISTORY:  History reviewed. No pertinent family history.    SOCIAL HISTORY:  Social History     Tobacco Use    Smoking status: Never    Smokeless tobacco: Never   Substance Use Topics    Alcohol use: Not Currently    Drug use: Never        VITALS:  Patient Vitals for the past 24 hrs:   BP Temp Temp src Pulse Resp SpO2 Height Weight   08/04/23 1307 110/72 97.5  F (36.4  C) Temporal 94 16 99 % 1.651 m (5' 5\") 59.4 kg (131 lb) "       PHYSICAL EXAM    General Appearance: Well-appearing, well-nourished, no acute distress   Cardio:  Regular rate and rhythm  Pulm:  No respiratory distress, clear to auscultation bilaterally  Back:  No CVA tenderness, normal ROM  Abdomen:  Soft, non-tender, no rebound or guarding. Uterus gravid midway between xiphoid and umbilicus. No tenderness with uterine palpation or movement. Pain to the lower left mid abdomen lateral to the uterus.   Extremities: Normal gait  Neuro:  Alert and oriented ×3     RADIOLOGY/LABS:  Reviewed all pertinent imaging. Please see official radiology report. All pertinent labs reviewed and interpreted.    Results for orders placed or performed during the hospital encounter of 08/04/23   Comprehensive metabolic panel   Result Value Ref Range    Sodium 137 136 - 145 mmol/L    Potassium 3.7 3.5 - 5.0 mmol/L    Chloride 106 98 - 107 mmol/L    Carbon Dioxide (CO2) 22 22 - 31 mmol/L    Anion Gap 9 5 - 18 mmol/L    Urea Nitrogen 4 (L) 8 - 22 mg/dL    Creatinine 0.50 (L) 0.60 - 1.10 mg/dL    Calcium 8.5 8.5 - 10.5 mg/dL    Glucose 89 70 - 125 mg/dL    Alkaline Phosphatase 73 45 - 120 U/L    AST 15 0 - 40 U/L    ALT 18 0 - 45 U/L    Protein Total 6.4 6.0 - 8.0 g/dL    Albumin 2.9 (L) 3.5 - 5.0 g/dL    Bilirubin Total 0.3 0.0 - 1.0 mg/dL    GFR Estimate >90 >60 mL/min/1.73m2   Result Value Ref Range    Lipase 9 0 - 52 U/L   CBC with platelets and differential   Result Value Ref Range    WBC Count 8.7 4.0 - 11.0 10e3/uL    RBC Count 3.35 (L) 3.80 - 5.20 10e6/uL    Hemoglobin 10.8 (L) 11.7 - 15.7 g/dL    Hematocrit 31.2 (L) 35.0 - 47.0 %    MCV 93 78 - 100 fL    MCH 32.2 26.5 - 33.0 pg    MCHC 34.6 31.5 - 36.5 g/dL    RDW 12.6 10.0 - 15.0 %    Platelet Count 172 150 - 450 10e3/uL    % Neutrophils 72 %    % Lymphocytes 21 %    % Monocytes 6 %    % Eosinophils 0 %    % Basophils 0 %    % Immature Granulocytes 1 %    NRBCs per 100 WBC 0 <1 /100    Absolute Neutrophils 6.2 1.6 - 8.3 10e3/uL    Absolute  Lymphocytes 1.8 0.8 - 5.3 10e3/uL    Absolute Monocytes 0.6 0.0 - 1.3 10e3/uL    Absolute Eosinophils 0.0 0.0 - 0.7 10e3/uL    Absolute Basophils 0.0 0.0 - 0.2 10e3/uL    Absolute Immature Granulocytes 0.1 <=0.4 10e3/uL    Absolute NRBCs 0.0 10e3/uL       The creation of this record is based on the scribe s observations of the work being performed by Nuria Corrales MD and the provider s statements to them. It was created on her behalf by Jeannette Kamara, a trained medical scribe. This document has been checked and approved by the attending provider.    Nuria Corrales MD  Emergency Medicine  [facility] EMERGENCY ROOM  [address] [id number] [phone]  Dept: [phone]       Nuria Corrales MD  08/04/23 9363

## 2023-08-04 NOTE — ED TRIAGE NOTES
The patient presents to the ED with left lower abdominal pain that began several weeks ago. She is 32 weeks pregnant. The patient was seen in the emergency department a couple of weeks ago and had some testing done but they did not do an ultrasound, which the patient reports she wanted done. The patient was instructed to follow up with her OBGYN, which she did- they instructed her to be seen in labor and delivery yesterday. She was seen there yesterday and had a workup done but again did not have an ultrasound. The patient was instructed to follow up with primary care as they did not think the pain was related to pregnancy. She reports the pain is causing her to have contractions but states they have subsided today. No active contractions. Denies nausea, vomiting, diarrhea, or constipation. No history of abdominal surgeries.

## 2023-08-04 NOTE — ED NOTES
EMERGENCY DEPARTMENT PROGRESS NOTE         ED COURSE AND MEDICAL DECISION MAKING  Patient was signed out to me by Dr. Corrales at 2 PM.  At time of sign out, pending MRI and disposition.    Mayte Jackson is a 37 year old female who presents abdominal pain. Patient endorses intermittent left lower abdominal pain for the past several weeks. Patient reports being seen a few weeks ago in the ED at [facility] for abdominal pain and had some testing done. She states they did not do an ultrasound like she wanted and was told to follow up with OB. She did and they instructed her to follow up with labor and delivery which she did yesterday. Labor and delivery also did not do an ultrasound and told her to follow up with primary care. Later that day she became nauseous. She called her PCP today and was told to come to the ER for an ultrasound.     ED Course as of 08/04/23 1729   Fri Aug 04, 2023                   1713 She has squamous cells in her urine and only few bacteria without nitrites or leukocyte esterase so at this point would not start on antibiotics.  Her MRI shows bilateral hydronephrosis as expected in third trimester of pregnancy.  We will have her follow-up with her OB/GYN as an outpatient for ongoing evaluation and discussed with her option for antibiotic but as she is otherwise not having urinary symptoms could defer to her OB/GYN and recollect a urine sample especially as urine sample from few days ago did not show any evidence of UTI.   1727 I spoke to the patient with her preferred phone Mongolian .  She is comfortable with the plan.  She will set up a follow-up with her OB/GYN next week instead of on the 21st as planned to discuss her ongoing discomfort and in the meantime will return here as needed if she develops fever or burning with urination or changes in her urine         5:21 PM Reevaluated and updated the patient with findings. We discussed the plan for discharge and the patient  is agreeable. Reviewed supportive cares, symptomatic treatment, outpatient follow up, and reasons to return to the Emergency Department. Patient to be discharged by ED RN.     Medications   acetaminophen (TYLENOL) tablet 975 mg (975 mg Oral $Given 8/4/23 1400)     New Prescriptions    No medications on file       LAB  Pertinent labs results reviewed   Results for orders placed or performed during the hospital encounter of 08/04/23   MR Abdomen w/o Contrast     Status: None    Narrative    EXAM: MR ABDOMEN W/O CONTRAST  LOCATION: [facility]  DATE: 8/4/2023    INDICATION: MRI abd AND pelv WITHOUT contrast. preg w L sided abd pain L mid LLQ  COMPARISON: None.  TECHNIQUE: Routine abdomen protocol with multiple T1 and T2 axial and coronal sequences without IV contrast.  CONTRAST: None.    FINDINGS: Single fetus in cephalic presentation. Detailed anatomic evaluation not performed. Anterior placenta with no previa.    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 Visualized bowel loops are normal caliber. No fluid collection or inflammatory fat stranding Imaged portion of the liver and gallbladder are normal. No biliary dilation.      Impression    IMPRESSION:  Moderate bilateral ureteral and renal collecting system dilation, which is commonly physiologic during the third trimester. Otherwise negative noncontrast MRI.   Comprehensive metabolic panel     Status: Abnormal   Result Value Ref Range    Sodium 137 136 - 145 mmol/L    Potassium 3.7 3.5 - 5.0 mmol/L    Chloride 106 98 - 107 mmol/L    Carbon Dioxide (CO2) 22 22 - 31 mmol/L    Anion Gap 9 5 - 18 mmol/L    Urea Nitrogen 4 (L) 8 - 22 mg/dL    Creatinine 0.50 (L) 0.60 - 1.10 mg/dL    Calcium 8.5 8.5 - 10.5 mg/dL    Glucose 89 70 - 125 mg/dL    Alkaline Phosphatase 73 45 - 120 U/L    AST 15 0 - 40 U/L    ALT 18 0 - 45 U/L    Protein Total 6.4 6.0 - 8.0 g/dL    Albumin 2.9 (L) 3.5 - 5.0 g/dL    Bilirubin Total 0.3 0.0 - 1.0  mg/dL    GFR Estimate >90 >60 mL/min/1.73m2   Lipase     Status: Normal   Result Value Ref Range    Lipase 9 0 - 52 U/L   UA with Microscopic reflex to Culture     Status: Abnormal    Specimen: Urine, Clean Catch   Result Value Ref Range    Color Urine Colorless Colorless, Straw, Light Yellow, Yellow    Appearance Urine Clear Clear    Glucose Urine Negative Negative mg/dL    Bilirubin Urine Negative Negative    Ketones Urine Negative Negative mg/dL    Specific Gravity Urine 1.007 1.001 - 1.030    Blood Urine Negative Negative    pH Urine 6.5 5.0 - 7.0    Protein Albumin Urine Negative Negative mg/dL    Urobilinogen Urine <2.0 <2.0 mg/dL    Nitrite Urine Negative Negative    Leukocyte Esterase Urine Negative Negative    Bacteria Urine Few (A) None Seen /HPF    Mucus Urine Present (A) None Seen /LPF    RBC Urine <1 <=2 /HPF    WBC Urine 1 <=5 /HPF    Squamous Epithelials Urine 1 <=1 /HPF    Narrative    Urine Culture not indicated   CBC with platelets and differential     Status: Abnormal   Result Value Ref Range    WBC Count 8.7 4.0 - 11.0 10e3/uL    RBC Count 3.35 (L) 3.80 - 5.20 10e6/uL    Hemoglobin 10.8 (L) 11.7 - 15.7 g/dL    Hematocrit 31.2 (L) 35.0 - 47.0 %    MCV 93 78 - 100 fL    MCH 32.2 26.5 - 33.0 pg    MCHC 34.6 31.5 - 36.5 g/dL    RDW 12.6 10.0 - 15.0 %    Platelet Count 172 150 - 450 10e3/uL    % Neutrophils 72 %    % Lymphocytes 21 %    % Monocytes 6 %    % Eosinophils 0 %    % Basophils 0 %    % Immature Granulocytes 1 %    NRBCs per 100 WBC 0 <1 /100    Absolute Neutrophils 6.2 1.6 - 8.3 10e3/uL    Absolute Lymphocytes 1.8 0.8 - 5.3 10e3/uL    Absolute Monocytes 0.6 0.0 - 1.3 10e3/uL    Absolute Eosinophils 0.0 0.0 - 0.7 10e3/uL    Absolute Basophils 0.0 0.0 - 0.2 10e3/uL    Absolute Immature Granulocytes 0.1 <=0.4 10e3/uL    Absolute NRBCs 0.0 10e3/uL   CBC with platelets differential     Status: Abnormal    Narrative    The following orders were created for panel order CBC with platelets  differential.  Procedure                               Abnormality         Status                     ---------                               -----------         ------                     CBC with platelets and d...[[id number]]  Abnormal            Final result                 Please view results for these tests on the individual orders.         RADIOLOGY    Pertinent imaging reviewed   Please see official radiology report.  MR Abdomen w/o Contrast   Final Result   IMPRESSION:   Moderate bilateral ureteral and renal collecting system dilation, which is commonly physiologic during the third trimester. Otherwise negative noncontrast MRI.          FINAL IMPRESSION    1. Left sided abdominal pain    2. Abdominal pain during pregnancy in third trimester         I, Beth Castaneda, am serving as a scribe to document services personally performed by Karol Taveras MD, based on my observations and the provider's statements to me.  I, Karol Taveras MD, attest that Beth Castaneda is acting in a scribe capacity, has observed my performance of the services and has documented them in accordance with my direction.          Karol Taveras MD  08/04/23 9242

## 2023-08-04 NOTE — DISCHARGE INSTRUCTIONS
As we discussed, use acetaminophen as needed for pain, follow the instructions on the bottle.  You should not take it any more frequently than every 6 hours.    You can also use a warm compress.    Follow-up with your OB/GYN doctor within a week.

## 2023-08-04 NOTE — Clinical Note
Mayte Jackson was seen and treated in our emergency department on 8/4/2023.         Sincerely,     [facility] Emergency Room

## 2023-08-04 NOTE — PROGRESS NOTES
"Outpatient/Triage Note:    Patient Name:  Mayte Jackson  :      1985  MRN:      [id number]      Assessment:   @ 32w0d here for evaluation of left lower quadrant pain.     Plan:   -Wet prep, UA, ROM+ negative.  and Following oral hydration contractions have now resolved. Cervix remains unchanged.   -IV bolus for ketones in urine and contractions on monitor, contractions spaced way out after IV fluids  -CBC WNL  -She denies headache, vision changes, URQ/epigastric pain, dizziness, lightheadedness, shortness of breath, and tachycardia.  - Discharge to home undelivered. Reviewed warning signs including decreased fetal movement, leaking of fluid, vaginal bleeding, or signs of labor. Reviewed how to contact on-call provider. Follow-up in clinic with OB provider as scheduled or sooner as needed. All questions answered. Agrees with plan.     Subjective:  Mayte Jackson is a 37 year old  at 32 weeks, who presented to [facility] for evaluation of lower left quadrant pain. Denies leaking of fluid, bleeding, or changes in fetal movement. She was evaluated 2 weeks ago at [facility] and was dx with round ligament pain. The pain has not resolved or gotten worse, sometimes she has a hard time walking. She is using a maternity belt and that helps. She reports regular daily BM and no concern for gas. Denies contraction pain.    Objective:  Vital signs: /68 (BP Location: Right arm, Patient Position: Semi-Rodrigez's, Cuff Size: Adult Regular)   Pulse 83   Temp 98.1  F (36.7  C) (Oral)   Resp 16   Ht 1.537 m (5' 0.5\")   Wt 59.4 kg (131 lb)   LMP 2022   BMI 25.16 kg/m    NST reactive  Abdomen soft    Physical Exam: A&Ox3  Abdomen: SIUP, abdomen non-tender  SVE: closed, thick and high  Legs: no edema, moves freely      Results:  Recent Results (from the past 168 hour(s))   Fetal fibronectin   Result Value Ref Range Status    Fetal Fibronectin Negative Negative Final    FFN Specimen Integrity " Satisfactory Specimen  Final   UA Macroscopic with reflex to Microscopic and Culture    Specimen: Urine, Midstream   Result Value Ref Range Status    Color Urine Yellow Colorless, Straw, Light Yellow, Yellow Final    Appearance Urine Clear Clear Final    Glucose Urine Negative Negative mg/dL Final    Bilirubin Urine Negative Negative Final    Ketones Urine 40 (A) Negative mg/dL Final    Specific Gravity Urine 1.023 1.001 - 1.030 Final    Blood Urine Negative Negative Final    pH Urine 6.0 5.0 - 7.0 Final    Protein Albumin Urine 20 (A) Negative mg/dL Final    Urobilinogen Urine <2.0 <2.0 mg/dL Final    Nitrite Urine Negative Negative Final    Leukocyte Esterase Urine Negative Negative Final    Mucus Urine Present (A) None Seen /LPF Final    RBC Urine <1 <=2 /HPF Final    WBC Urine 1 <=5 /HPF Final    Squamous Epithelials Urine 1 <=1 /HPF Final     *Note: Due to a large number of results and/or encounters for the requested time period, some results have not been displayed. A complete set of results can be found in Results Review.         Provider: HANY Del Angel CNM, Dr consulted on plan of care and is agreeable.

## 2023-09-08 ENCOUNTER — TRANSFERRED RECORDS (OUTPATIENT)
Dept: HEALTH INFORMATION MANAGEMENT | Facility: CLINIC | Age: 38
End: 2023-09-08
Payer: COMMERCIAL

## 2023-09-14 ENCOUNTER — ANESTHESIA (OUTPATIENT)
Dept: OBGYN | Facility: CLINIC | Age: 38
End: 2023-09-14
Payer: COMMERCIAL

## 2023-09-14 ENCOUNTER — ANESTHESIA EVENT (OUTPATIENT)
Dept: OBGYN | Facility: CLINIC | Age: 38
End: 2023-09-14
Payer: COMMERCIAL

## 2023-09-14 PROBLEM — Z34.90 ENCOUNTER FOR INDUCTION OF LABOR: Status: ACTIVE | Noted: 2023-09-14

## 2023-09-14 PROCEDURE — 250N000011 HC RX IP 250 OP 636: Mod: JZ | Performed by: ANESTHESIOLOGY

## 2023-09-14 PROCEDURE — 250N000009 HC RX 250: Performed by: ANESTHESIOLOGY

## 2023-09-14 PROCEDURE — 370N000003 HC ANESTHESIA WARD SERVICE: Performed by: ANESTHESIOLOGY

## 2023-09-14 RX ORDER — BUPIVACAINE HYDROCHLORIDE 2.5 MG/ML
INJECTION, SOLUTION EPIDURAL; INFILTRATION; INTRACAUDAL
Status: COMPLETED | OUTPATIENT
Start: 2023-09-14 | End: 2023-09-14

## 2023-09-14 RX ORDER — LIDOCAINE HYDROCHLORIDE AND EPINEPHRINE 15; 5 MG/ML; UG/ML
INJECTION, SOLUTION EPIDURAL PRN
Status: DISCONTINUED | OUTPATIENT
Start: 2023-09-14 | End: 2023-09-14

## 2023-09-14 RX ADMIN — LIDOCAINE HYDROCHLORIDE,EPINEPHRINE BITARTRATE 5 ML: 15; .005 INJECTION, SOLUTION EPIDURAL; INFILTRATION; INTRACAUDAL; PERINEURAL at 15:53

## 2023-09-14 RX ADMIN — BUPIVACAINE HYDROCHLORIDE 4 ML: 2.5 INJECTION, SOLUTION EPIDURAL; INFILTRATION; INTRACAUDAL at 15:45

## 2023-09-14 NOTE — ANESTHESIA PROCEDURE NOTES
"Epidural catheter Procedure Note    Pre-Procedure   Staff -        Anesthesiologist:  Evan Cruz MD       Performed By: anesthesiologist       Location: OB       Procedure Start/Stop Times: 9/14/2023 3:45 PM and 9/14/2023 4:02 PM       Pre-Anesthestic Checklist: patient identified, IV checked, risks and benefits discussed, informed consent, monitors and equipment checked, pre-op evaluation and at physician/surgeon's request  Timeout:       Correct Patient: Yes        Correct Procedure: Yes        Correct Site: Yes        Correct Position: Yes   Procedure Documentation  Procedure: epidural catheter       Patient Position: sitting       Skin prep: Chloraprep       Local skin infiltrated with mL of 1% lidocaine.        Insertion Site: L3-4. (midline approach).       Technique: LORT saline        Needle Type: [employer] needle       Needle Gauge: 18.        Needle Length (Inches): 3.5           Catheter threaded easily.         5 cm epidural space.         Threaded 8.5 cm at skin.         # of attempts: 1 and  # of redirects:  1    Assessment/Narrative         Paresthesias: No.       Test dose of mL lidocaine 1.5% w/ 1:200,000 epinephrine at.         Test dose negative, 3 minutes after injection, for signs of intravascular, subdural, or intrathecal injection.       Insertion/Infusion Method: LORT saline       Aspiration negative for Heme or CSF via Epidural Catheter.    Medication(s) Administered   0.25% Bupivacaine PF (Epidural) - EPIDURAL   4 mL - 9/14/2023 3:45:00 PM  Medication Administration Time: 9/14/2023 3:45 PM      FOR [facility] ONLY:   Pain Team Contact information: please page the Pain Team Via [employer]. Search \"Pain\". During daytime hours, please page the attending first. At night please page the resident first.      "

## 2023-09-14 NOTE — ANESTHESIA PREPROCEDURE EVALUATION
Anesthesia Pre-Procedure Evaluation    Patient: Mayte Jackson   MRN: [id number] : 1985        Procedure : * No procedures listed *  Induction       History reviewed. No pertinent past medical history.   History reviewed. No pertinent surgical history.   No Known Allergies   Social History     Tobacco Use    Smoking status: Never    Smokeless tobacco: Never   Substance Use Topics    Alcohol use: Not Currently      Wt Readings from Last 1 Encounters:   23 60.8 kg (134 lb)        Anesthesia Evaluation            ROS/MED HX  ENT/Pulmonary:  - neg pulmonary ROS     Neurologic:  - neg neurologic ROS     Cardiovascular:  - neg cardiovascular ROS     METS/Exercise Tolerance:     Hematologic:  - neg hematologic  ROS     Musculoskeletal:       GI/Hepatic:  - neg GI/hepatic ROS     Renal/Genitourinary:       Endo:  - neg endo ROS     Psychiatric/Substance Use:  - neg psychiatric ROS     Infectious Disease:       Malignancy:       Other:     (-) previous  and TOLAC candidate       Physical Exam    Airway        Mallampati: II   TM distance: > 3 FB   Neck ROM: full   Mouth opening: > 3 cm    Respiratory Devices and Support         Dental  no notable dental history         Cardiovascular   cardiovascular exam normal          Pulmonary   pulmonary exam normal                OUTSIDE LABS:  CBC:   Lab Results   Component Value Date    WBC 8.7 2023    WBC 10.8 2023    HGB 11.0 (L) 2023    HGB 10.8 (L) 2023    HCT 31.2 (L) 2023    HCT 30.5 (L) 2023     2023     2023     BMP:   Lab Results   Component Value Date     2023     2023    POTASSIUM 3.7 2023    POTASSIUM 3.9 2023    CHLORIDE 106 2023    CHLORIDE 101 2023    CO2 22 2023    CO2 24 2023    BUN 4 (L) 2023    BUN 8.3 2023    CR 0.50 (L) 2023    CR 0.45 (L) 2023    GLC 89 2023    GLC 88 2023     COAGS:  No results found for: PTT, INR, FIBR  POC: No results found for: BGM, HCG, HCGS  HEPATIC:   Lab Results   Component Value Date    ALBUMIN 2.9 (L) 08/04/2023    PROTTOTAL 6.4 08/04/2023    ALT 18 08/04/2023    AST 15 08/04/2023    ALKPHOS 73 08/04/2023    BILITOTAL 0.3 08/04/2023     OTHER:   Lab Results   Component Value Date    JAVY 8.5 08/04/2023    LIPASE 9 08/04/2023       Anesthesia Plan    ASA Status:  2       Anesthesia Type: Epidural.              Consents    Anesthesia Plan(s) and associated risks, benefits, and realistic alternatives discussed. Questions answered and patient/representative(s) expressed understanding.     - Discussed:     - Discussed with:  Patient            Postoperative Care            Comments:           neg OB ROS.       Evan Cruz MD

## 2023-09-15 NOTE — ANESTHESIA POSTPROCEDURE EVALUATION
Patient: Mayte Jackson    Procedure: * No procedures listed *  Induction    Anesthesia Type:  Epidural    Note:  Disposition: Inpatient   Postop Pain Control: Uneventful            Sign Out: Well controlled pain   PONV: No   Neuro/Psych: Uneventful            Sign Out: Acceptable/Baseline neuro status   Airway/Respiratory: Uneventful            Sign Out: Acceptable/Baseline resp. status   CV/Hemodynamics: Uneventful            Sign Out: Acceptable CV status; No obvious hypovolemia; No obvious fluid overload   Other NRE: NONE   DID A NON-ROUTINE EVENT OCCUR? No       Last vitals:  Vitals:    09/15/23 0000 09/15/23 0340 09/15/23 1003   BP: 107/67 109/62 111/71   Pulse: 58 58    Resp: 16 16 16   Temp: 36.9  C (98.4  F) 36.9  C (98.4  F) 36.8  C (98.2  F)   SpO2:          Electronically Signed By: Shun Restrepo DO  September 15, 2023  4:01 PM

## 2024-07-28 ENCOUNTER — HEALTH MAINTENANCE LETTER (OUTPATIENT)
Age: 39
End: 2024-07-28

## 2025-08-10 ENCOUNTER — HEALTH MAINTENANCE LETTER (OUTPATIENT)
Age: 40
End: 2025-08-10